# Patient Record
Sex: MALE | Race: WHITE | NOT HISPANIC OR LATINO
[De-identification: names, ages, dates, MRNs, and addresses within clinical notes are randomized per-mention and may not be internally consistent; named-entity substitution may affect disease eponyms.]

---

## 2019-11-14 ENCOUNTER — APPOINTMENT (OUTPATIENT)
Dept: ORTHOPEDIC SURGERY | Facility: CLINIC | Age: 39
End: 2019-11-14
Payer: COMMERCIAL

## 2019-11-14 VITALS — BODY MASS INDEX: 25.18 KG/M2 | WEIGHT: 190 LBS | HEIGHT: 73 IN

## 2019-11-14 DIAGNOSIS — M17.12 UNILATERAL PRIMARY OSTEOARTHRITIS, LEFT KNEE: ICD-10-CM

## 2019-11-14 PROBLEM — Z00.00 ENCOUNTER FOR PREVENTIVE HEALTH EXAMINATION: Status: ACTIVE | Noted: 2019-11-14

## 2019-11-14 PROCEDURE — 73562 X-RAY EXAM OF KNEE 3: CPT | Mod: LT

## 2019-11-14 PROCEDURE — 99204 OFFICE O/P NEW MOD 45 MIN: CPT

## 2019-11-14 NOTE — PHYSICAL EXAM
[de-identified] : Left knee shows marked degenerative arthritis. [de-identified] : Left knee shows mild swelling there is a well-healed medial incision. There is crepitus. There is joint line pain. Negative Tahir no instability neurovascular exam is normal

## 2019-11-14 NOTE — DISCUSSION/SUMMARY
[de-identified] : Ice and take anti-inflammatory as needed. I discussed the possibility of gel injection. At this time we will hold off any additional treatment. Follow up will be as needed.

## 2019-11-14 NOTE — HISTORY OF PRESENT ILLNESS
[de-identified] : Location: left knee\par Quality: Aching , sharp, shooting \par Duration: 1 week\par Context:  Post running atraumatic\par Aggravating Factors: Running, exercise, Going from sitting to standing\par Conservative treatment:  Rest, otc medication, ice, compression\par Associated Symptoms:  Swelling\par Prior Studies: n.a \par Multiple injuries in the past prior patella reconstruction.\par

## 2020-01-16 ENCOUNTER — APPOINTMENT (OUTPATIENT)
Dept: ORTHOPEDIC SURGERY | Facility: CLINIC | Age: 40
End: 2020-01-16
Payer: COMMERCIAL

## 2020-01-16 DIAGNOSIS — M17.10 UNILATERAL PRIMARY OSTEOARTHRITIS, UNSPECIFIED KNEE: ICD-10-CM

## 2020-01-16 PROCEDURE — 99213 OFFICE O/P EST LOW 20 MIN: CPT

## 2020-01-16 NOTE — HISTORY OF PRESENT ILLNESS
[de-identified] : Patient reports knee pain that has been going on for 2 months following a marathon. X-ray was done on 11/14/19 in house. Pain persists with yoga and exercise.

## 2020-01-16 NOTE — ASSESSMENT
[FreeTextEntry1] : Discussed at length the patient underlying arthritis in severity as well as treatment options.  Patient is complex anti-inflammatories and home exercises and if no improvement consideration to cortisone injection or Visco supplementation

## 2020-01-16 NOTE — PHYSICAL EXAM
[de-identified] : Left knee\par \par Constitutional: \par The patient is healthy-appearing and in no apparent distress. \par \par Gait:\par The patient ambulates with a normal gait and no limp.\par \par Cardiovascular System: \par The capillary refill is less than 2 seconds. \par \par Skin: \par There are no skin abnormalities.\par \par Left Knee:\par  \par Bony Palpation: \par There is no tenderness of the medial joint line. \par There is no tenderness of the lateral joint line.\par There is tenderness of the medial femoral chondyle.\par There is tenderness of the lateral femoral chondyle.\par There is no tenderness of the tibial tubercle.\par There is no tenderness of the superior patella.\par There is no tenderness of the inferior patella.\par There is tenderness of the medial patellar facet.\par There is tenderness of the lateral patellar facet.\par \par Soft Tissue Palpation: \par There is tenderness of the medial retinaculum.\par There is tenderness of the lateral retinaculum.\par There is no tenderness of the quadriceps tendon.\par There is no tenderness of the patella tendon.\par There is no tenderness of the ITB.\par There is no tenderness of the pes anserine.\par \par Active Range of Motion: \par The range of motion at the knee actively and passively is 3  - 135. \par \par Special Tests: \par There is a negative Apley.\par There is a negative Steinmanns. \par There is a negative Lachman and Anterior Drawer.\par There is a negative Posterior Drawer.  \par There is no varus or valgus laxity.\par \par Strength: \par There is 5/5 hip flexion and 5/5 knee flexion and extension.  \par \par Psychiatric: \par The patient demonstrates a normal mood and affect and is active and alert [de-identified] : X-ray left knee.  There is moderate tricompartmental arthritis more severe underneath the patellofemoral joint with large spurs\par